# Patient Record
Sex: FEMALE | Race: WHITE | NOT HISPANIC OR LATINO | Employment: UNEMPLOYED | URBAN - METROPOLITAN AREA
[De-identification: names, ages, dates, MRNs, and addresses within clinical notes are randomized per-mention and may not be internally consistent; named-entity substitution may affect disease eponyms.]

---

## 2020-06-10 ENCOUNTER — TELEPHONE ENCOUNTER (OUTPATIENT)
Dept: URBAN - METROPOLITAN AREA CLINIC 68 | Facility: CLINIC | Age: 60
End: 2020-06-10

## 2020-07-13 ENCOUNTER — OFFICE VISIT (OUTPATIENT)
Dept: URBAN - METROPOLITAN AREA CLINIC 66 | Facility: CLINIC | Age: 60
End: 2020-07-13

## 2020-07-13 ENCOUNTER — OFFICE VISIT (OUTPATIENT)
Dept: URBAN - METROPOLITAN AREA CLINIC 68 | Facility: CLINIC | Age: 60
End: 2020-07-13

## 2020-07-21 ENCOUNTER — TELEPHONE ENCOUNTER (OUTPATIENT)
Dept: URBAN - METROPOLITAN AREA CLINIC 68 | Facility: CLINIC | Age: 60
End: 2020-07-21

## 2020-09-16 ENCOUNTER — OFFICE VISIT (OUTPATIENT)
Dept: URBAN - METROPOLITAN AREA CLINIC 68 | Facility: CLINIC | Age: 60
End: 2020-09-16

## 2020-10-06 ENCOUNTER — OFFICE VISIT (OUTPATIENT)
Dept: URBAN - METROPOLITAN AREA CLINIC 68 | Facility: CLINIC | Age: 60
End: 2020-10-06

## 2020-10-16 ENCOUNTER — OFFICE VISIT (OUTPATIENT)
Dept: URBAN - METROPOLITAN AREA CLINIC 68 | Facility: CLINIC | Age: 60
End: 2020-10-16

## 2020-11-27 ENCOUNTER — TELEPHONE ENCOUNTER (OUTPATIENT)
Dept: URBAN - METROPOLITAN AREA CLINIC 68 | Facility: CLINIC | Age: 60
End: 2020-11-27

## 2020-11-29 ENCOUNTER — TELEPHONE ENCOUNTER (OUTPATIENT)
Dept: URBAN - METROPOLITAN AREA CLINIC 68 | Facility: CLINIC | Age: 60
End: 2020-11-29

## 2020-12-01 ENCOUNTER — TELEPHONE ENCOUNTER (OUTPATIENT)
Dept: URBAN - METROPOLITAN AREA CLINIC 68 | Facility: CLINIC | Age: 60
End: 2020-12-01

## 2020-12-04 ENCOUNTER — OFFICE VISIT (OUTPATIENT)
Dept: URBAN - METROPOLITAN AREA CLINIC 68 | Facility: CLINIC | Age: 60
End: 2020-12-04

## 2020-12-22 ENCOUNTER — TELEPHONE ENCOUNTER (OUTPATIENT)
Dept: URBAN - METROPOLITAN AREA CLINIC 68 | Facility: CLINIC | Age: 60
End: 2020-12-22

## 2020-12-24 ENCOUNTER — TELEPHONE ENCOUNTER (OUTPATIENT)
Dept: URBAN - METROPOLITAN AREA CLINIC 68 | Facility: CLINIC | Age: 60
End: 2020-12-24

## 2020-12-24 ENCOUNTER — OFFICE VISIT (OUTPATIENT)
Dept: URBAN - METROPOLITAN AREA CLINIC 68 | Facility: CLINIC | Age: 60
End: 2020-12-24

## 2021-01-01 ENCOUNTER — OFFICE VISIT (OUTPATIENT)
Dept: URBAN - METROPOLITAN AREA CLINIC 68 | Facility: CLINIC | Age: 61
End: 2021-01-01

## 2021-01-05 ENCOUNTER — TELEPHONE ENCOUNTER (OUTPATIENT)
Dept: URBAN - METROPOLITAN AREA CLINIC 68 | Facility: CLINIC | Age: 61
End: 2021-01-05

## 2021-02-10 ENCOUNTER — TELEPHONE ENCOUNTER (OUTPATIENT)
Dept: URBAN - METROPOLITAN AREA CLINIC 68 | Facility: CLINIC | Age: 61
End: 2021-02-10

## 2021-02-22 ENCOUNTER — OFFICE VISIT (OUTPATIENT)
Dept: URBAN - METROPOLITAN AREA CLINIC 66 | Facility: CLINIC | Age: 61
End: 2021-02-22

## 2021-04-09 ENCOUNTER — OFFICE VISIT (OUTPATIENT)
Dept: URBAN - METROPOLITAN AREA CLINIC 68 | Facility: CLINIC | Age: 61
End: 2021-04-09

## 2021-07-28 ENCOUNTER — TELEPHONE ENCOUNTER (OUTPATIENT)
Dept: URBAN - METROPOLITAN AREA CLINIC 68 | Facility: CLINIC | Age: 61
End: 2021-07-28

## 2021-08-23 ENCOUNTER — OFFICE VISIT (OUTPATIENT)
Dept: URBAN - METROPOLITAN AREA CLINIC 66 | Facility: CLINIC | Age: 61
End: 2021-08-23

## 2021-09-27 ENCOUNTER — OFFICE VISIT (OUTPATIENT)
Dept: URBAN - METROPOLITAN AREA CLINIC 66 | Facility: CLINIC | Age: 61
End: 2021-09-27

## 2021-12-17 ENCOUNTER — OFFICE VISIT (OUTPATIENT)
Dept: URBAN - METROPOLITAN AREA CLINIC 68 | Facility: CLINIC | Age: 61
End: 2021-12-17

## 2022-02-07 ENCOUNTER — TELEPHONE ENCOUNTER (OUTPATIENT)
Dept: URBAN - METROPOLITAN AREA CLINIC 68 | Facility: CLINIC | Age: 62
End: 2022-02-07

## 2022-06-04 ENCOUNTER — TELEPHONE ENCOUNTER (OUTPATIENT)
Dept: URBAN - METROPOLITAN AREA CLINIC 68 | Facility: CLINIC | Age: 62
End: 2022-06-04

## 2022-06-04 RX ORDER — POLYETHYLENE GLYCOL 3350, SODIUM SULFATE ANHYDROUS, SODIUM BICARBONATE, SODIUM CHLORIDE, POTASSIUM CHLORIDE 236; 22.74; 6.74; 5.86; 2.97 G/4L; G/4L; G/4L; G/4L; G/4L
POWDER, FOR SOLUTION ORAL ONCE
Qty: 1 | Refills: 0 | OUTPATIENT
Start: 2019-07-08 | End: 2019-07-09

## 2022-06-04 RX ORDER — LUBIPROSTONE 24 UG/1
AMITIZA( 24MCG ORAL  TWO TIMES DAILY ) INACTIVE -HX ENTRY CAPSULE, GELATIN COATED ORAL
OUTPATIENT
Start: 2020-03-02

## 2022-06-04 RX ORDER — LINACLOTIDE 290 UG/1
CAPSULE, GELATIN COATED ORAL DAILY
Qty: 90 | Refills: 90 | OUTPATIENT
Start: 2020-03-23 | End: 2021-02-22

## 2022-06-04 RX ORDER — PANTOPRAZOLE SODIUM 40 MG/1
TABLET, DELAYED RELEASE ORAL DAILY
Qty: 90 | Refills: 0 | OUTPATIENT
Start: 2020-12-22 | End: 2021-03-22

## 2022-06-05 ENCOUNTER — TELEPHONE ENCOUNTER (OUTPATIENT)
Dept: URBAN - METROPOLITAN AREA CLINIC 68 | Facility: CLINIC | Age: 62
End: 2022-06-05

## 2022-06-05 RX ORDER — BRAN 5 G
LIPOFLAVONOID(  ORAL  TWICE A DAY ) ACTIVE -HX ENTRY WAFER ORAL TWICE A DAY
Status: ACTIVE | COMMUNITY
Start: 2021-09-27

## 2022-06-05 RX ORDER — AMITRIPTYLINE HYDROCHLORIDE 10 MG/1
AMITRIPTYLINE HCL( 10MG ORAL  DAILY ) ACTIVE -HX ENTRY TABLET, FILM COATED ORAL DAILY
Status: ACTIVE | COMMUNITY
Start: 2021-09-27

## 2022-06-05 RX ORDER — BACLOFEN 20 MG/1
BACLOFEN( 20MG ORAL  TWICE A DAY ) ACTIVE -HX ENTRY TABLET ORAL TWICE A DAY
Status: ACTIVE | COMMUNITY
Start: 2021-09-27

## 2022-06-05 RX ORDER — AMLODIPINE BESYLATE 5 MG/1
AMLODIPINE BESYLATE( 5MG ORAL  DAILY ) ACTIVE -HX ENTRY TABLET ORAL DAILY
Status: ACTIVE | COMMUNITY
Start: 2021-09-27

## 2022-06-05 RX ORDER — PRUCALOPRIDE 2 MG/1
TABLET, FILM COATED ORAL DAILY
Qty: 90 | Refills: 90 | Status: ACTIVE | COMMUNITY
Start: 2022-02-07

## 2022-06-05 RX ORDER — ESTRADIOL 0.1 MG/G
ESTRACE( 0.1MG/GM VAGINAL   ) ACTIVE -HX ENTRY CREAM VAGINAL
Status: ACTIVE | COMMUNITY
Start: 2021-09-27

## 2022-06-05 RX ORDER — SUMATRIPTAN SUCCINATE 50 MG
IMITREX( 50MG ORAL  DAILY ) ACTIVE -HX ENTRY TABLET ORAL DAILY
Status: ACTIVE | COMMUNITY
Start: 2021-09-27

## 2022-06-05 RX ORDER — LOSARTAN POTASSIUM AND HYDROCHLOROTHIAZIDE 100; 12.5 MG/1; MG/1
LOSARTAN POTASSIUM-HCTZ( 100-12.5MG ORAL  DAILY ) ACTIVE -HX ENTRY TABLET, FILM COATED ORAL DAILY
Status: ACTIVE | COMMUNITY
Start: 2021-09-27

## 2022-06-25 ENCOUNTER — TELEPHONE ENCOUNTER (OUTPATIENT)
Age: 62
End: 2022-06-25

## 2022-06-25 RX ORDER — LUBIPROSTONE 24 UG/1
AMITIZA( 24MCG ORAL  TWO TIMES DAILY ) INACTIVE -HX ENTRY CAPSULE, GELATIN COATED ORAL
OUTPATIENT
Start: 2020-03-02

## 2022-06-25 RX ORDER — LINACLOTIDE 290 UG/1
CAPSULE, GELATIN COATED ORAL DAILY
Qty: 90 | Refills: 90 | OUTPATIENT
Start: 2020-03-23 | End: 2021-02-22

## 2022-06-25 RX ORDER — PANTOPRAZOLE 40 MG/1
TABLET, DELAYED RELEASE ORAL DAILY
Qty: 90 | Refills: 0 | OUTPATIENT
Start: 2020-12-22 | End: 2021-03-22

## 2022-06-25 RX ORDER — POLYETHYLENE GLYCOL 3350, SODIUM SULFATE ANHYDROUS, SODIUM BICARBONATE, SODIUM CHLORIDE, POTASSIUM CHLORIDE 236; 22.74; 6.74; 5.86; 2.97 G/4L; G/4L; G/4L; G/4L; G/4L
POWDER, FOR SOLUTION ORAL ONCE
Qty: 1 | Refills: 0 | OUTPATIENT
Start: 2019-07-08 | End: 2019-07-09

## 2022-06-26 ENCOUNTER — TELEPHONE ENCOUNTER (OUTPATIENT)
Age: 62
End: 2022-06-26

## 2022-06-26 RX ORDER — LOSARTAN POTASSIUM AND HYDROCHLOROTHIAZIDE 12.5; 1 MG/1; MG/1
LOSARTAN POTASSIUM-HCTZ( 100-12.5MG ORAL  DAILY ) ACTIVE -HX ENTRY TABLET, FILM COATED ORAL DAILY
Status: ACTIVE | COMMUNITY
Start: 2021-09-27

## 2022-06-26 RX ORDER — AMLODIPINE BESYLATE 5 MG/1
AMLODIPINE BESYLATE( 5MG ORAL  DAILY ) ACTIVE -HX ENTRY TABLET ORAL DAILY
Status: ACTIVE | COMMUNITY
Start: 2021-09-27

## 2022-06-26 RX ORDER — AMITRIPTYLINE HYDROCHLORIDE 10 MG/1
AMITRIPTYLINE HCL( 10MG ORAL  DAILY ) ACTIVE -HX ENTRY TABLET, FILM COATED ORAL DAILY
Status: ACTIVE | COMMUNITY
Start: 2021-09-27

## 2022-06-26 RX ORDER — SUMATRIPTAN SUCCINATE 50 MG
IMITREX( 50MG ORAL  DAILY ) ACTIVE -HX ENTRY TABLET ORAL DAILY
Status: ACTIVE | COMMUNITY
Start: 2021-09-27

## 2022-06-26 RX ORDER — PRUCALOPRIDE 2 MG/1
TABLET, FILM COATED ORAL DAILY
Qty: 90 | Refills: 90 | Status: ACTIVE | COMMUNITY
Start: 2022-02-07

## 2022-06-26 RX ORDER — BACLOFEN 20 MG/1
BACLOFEN( 20MG ORAL  TWICE A DAY ) ACTIVE -HX ENTRY TABLET ORAL TWICE A DAY
Status: ACTIVE | COMMUNITY
Start: 2021-09-27

## 2022-06-26 RX ORDER — ESTRADIOL 0.1 MG/G
ESTRACE( 0.1MG/GM VAGINAL   ) ACTIVE -HX ENTRY CREAM VAGINAL
Status: ACTIVE | COMMUNITY
Start: 2021-09-27

## 2022-06-26 RX ORDER — CALCIUM CARBONATE/VITAMIN D3 600 MG-10
CALCIUM-VITAMIN D( 600MG ORAL   ) ACTIVE -HX ENTRY TABLET ORAL
Status: ACTIVE | COMMUNITY
Start: 2021-09-27

## 2022-09-19 ENCOUNTER — TELEPHONE ENCOUNTER (OUTPATIENT)
Dept: URBAN - METROPOLITAN AREA CLINIC 68 | Facility: CLINIC | Age: 62
End: 2022-09-19

## 2022-09-19 RX ORDER — PRUCALOPRIDE 2 MG/1
1 TABLET TABLET, FILM COATED ORAL ONCE A DAY
Qty: 90 TABLET | Refills: 0
Start: 2022-02-07 | End: 2022-12-17

## 2022-09-26 ENCOUNTER — OFFICE VISIT (OUTPATIENT)
Dept: URBAN - METROPOLITAN AREA CLINIC 66 | Facility: CLINIC | Age: 62
End: 2022-09-26

## 2022-10-18 ENCOUNTER — OFFICE VISIT (OUTPATIENT)
Dept: URBAN - METROPOLITAN AREA CLINIC 68 | Facility: CLINIC | Age: 62
End: 2022-10-18

## 2022-10-18 RX ORDER — PRUCALOPRIDE 2 MG/1
1 TABLET TABLET, FILM COATED ORAL ONCE A DAY
Qty: 90 TABLET | Refills: 3
Start: 2022-02-07 | End: 2023-10-13

## 2022-10-18 RX ORDER — AMLODIPINE BESYLATE 5 MG/1
AMLODIPINE BESYLATE( 5MG ORAL  DAILY ) ACTIVE -HX ENTRY TABLET ORAL DAILY
Status: ACTIVE | COMMUNITY
Start: 2021-09-27

## 2022-10-18 RX ORDER — BACLOFEN 20 MG/1
BACLOFEN( 20MG ORAL  TWICE A DAY ) ACTIVE -HX ENTRY TABLET ORAL TWICE A DAY
Status: ACTIVE | COMMUNITY
Start: 2021-09-27

## 2022-10-18 RX ORDER — LOSARTAN POTASSIUM AND HYDROCHLOROTHIAZIDE 100; 12.5 MG/1; MG/1
LOSARTAN POTASSIUM-HCTZ( 100-12.5MG ORAL  DAILY ) ACTIVE -HX ENTRY TABLET, FILM COATED ORAL DAILY
Status: ACTIVE | COMMUNITY
Start: 2021-09-27

## 2022-10-18 RX ORDER — BRAN 5 G
LIPOFLAVONOID(  ORAL  TWICE A DAY ) ACTIVE -HX ENTRY WAFER ORAL TWICE A DAY
Status: ACTIVE | COMMUNITY
Start: 2021-09-27

## 2022-10-18 RX ORDER — SUMATRIPTAN SUCCINATE 50 MG
IMITREX( 50MG ORAL  DAILY ) ACTIVE -HX ENTRY TABLET ORAL DAILY
Status: ACTIVE | COMMUNITY
Start: 2021-09-27

## 2022-10-18 RX ORDER — AMITRIPTYLINE HYDROCHLORIDE 10 MG/1
AMITRIPTYLINE HCL( 10MG ORAL  DAILY ) ACTIVE -HX ENTRY TABLET, FILM COATED ORAL DAILY
Status: ACTIVE | COMMUNITY
Start: 2021-09-27

## 2022-10-18 RX ORDER — PRUCALOPRIDE 2 MG/1
1 TABLET TABLET, FILM COATED ORAL ONCE A DAY
Qty: 90 TABLET | Refills: 0 | Status: ACTIVE | COMMUNITY
Start: 2022-02-07 | End: 2022-12-17

## 2022-10-18 RX ORDER — SOD SULF/POT CHLORIDE/MAG SULF 1.479 G
AS DIRECTED TABLET ORAL ONCE
Qty: 1 PACKET | Refills: 0 | OUTPATIENT
Start: 2022-10-18

## 2022-10-18 RX ORDER — ESTRADIOL 0.1 MG/G
ESTRACE( 0.1MG/GM VAGINAL   ) ACTIVE -HX ENTRY CREAM VAGINAL
Status: ACTIVE | COMMUNITY
Start: 2021-09-27

## 2022-10-18 NOTE — HPI-MIGRATED HPI
Transition of Care : Patient evaluated due to IBS and colon polyps.  Currently on treatment with motegrity for constipation.  Denies nausea, vomits, dysphagia, odynophagia, heartburn, abdominal pain, diarrhea,  GI bleeding or weight loss

## 2022-11-09 ENCOUNTER — OFFICE VISIT (OUTPATIENT)
Dept: URBAN - METROPOLITAN AREA SURGERY CENTER 12 | Facility: SURGERY CENTER | Age: 62
End: 2022-11-09

## 2022-11-09 RX ORDER — AMITRIPTYLINE HYDROCHLORIDE 10 MG/1
AMITRIPTYLINE HCL( 10MG ORAL  DAILY ) ACTIVE -HX ENTRY TABLET, FILM COATED ORAL DAILY
Status: ACTIVE | COMMUNITY
Start: 2021-09-27

## 2022-11-09 RX ORDER — ESTRADIOL 0.1 MG/G
ESTRACE( 0.1MG/GM VAGINAL   ) ACTIVE -HX ENTRY CREAM VAGINAL
Status: ACTIVE | COMMUNITY
Start: 2021-09-27

## 2022-11-09 RX ORDER — SUMATRIPTAN SUCCINATE 50 MG
IMITREX( 50MG ORAL  DAILY ) ACTIVE -HX ENTRY TABLET ORAL DAILY
Status: ACTIVE | COMMUNITY
Start: 2021-09-27

## 2022-11-09 RX ORDER — BACLOFEN 20 MG/1
BACLOFEN( 20MG ORAL  TWICE A DAY ) ACTIVE -HX ENTRY TABLET ORAL TWICE A DAY
Status: ACTIVE | COMMUNITY
Start: 2021-09-27

## 2022-11-09 RX ORDER — PRUCALOPRIDE 2 MG/1
1 TABLET TABLET, FILM COATED ORAL ONCE A DAY
Qty: 90 TABLET | Refills: 3 | Status: ACTIVE | COMMUNITY
Start: 2022-02-07 | End: 2023-10-13

## 2022-11-09 RX ORDER — LOSARTAN POTASSIUM AND HYDROCHLOROTHIAZIDE 100; 12.5 MG/1; MG/1
LOSARTAN POTASSIUM-HCTZ( 100-12.5MG ORAL  DAILY ) ACTIVE -HX ENTRY TABLET, FILM COATED ORAL DAILY
Status: ACTIVE | COMMUNITY
Start: 2021-09-27

## 2022-11-09 RX ORDER — BRAN 5 G
LIPOFLAVONOID(  ORAL  TWICE A DAY ) ACTIVE -HX ENTRY WAFER ORAL TWICE A DAY
Status: ACTIVE | COMMUNITY
Start: 2021-09-27

## 2022-11-09 RX ORDER — AMLODIPINE BESYLATE 5 MG/1
AMLODIPINE BESYLATE( 5MG ORAL  DAILY ) ACTIVE -HX ENTRY TABLET ORAL DAILY
Status: ACTIVE | COMMUNITY
Start: 2021-09-27

## 2022-11-09 RX ORDER — SOD SULF/POT CHLORIDE/MAG SULF 1.479 G
AS DIRECTED TABLET ORAL ONCE
Qty: 1 PACKET | Refills: 0 | Status: ACTIVE | COMMUNITY
Start: 2022-10-18

## 2023-09-19 ENCOUNTER — OFFICE VISIT (OUTPATIENT)
Dept: URBAN - METROPOLITAN AREA CLINIC 66 | Facility: CLINIC | Age: 63
End: 2023-09-19
Payer: MEDICARE

## 2023-09-19 ENCOUNTER — LAB OUTSIDE AN ENCOUNTER (OUTPATIENT)
Dept: URBAN - METROPOLITAN AREA CLINIC 66 | Facility: CLINIC | Age: 63
End: 2023-09-19

## 2023-09-19 ENCOUNTER — WEB ENCOUNTER (OUTPATIENT)
Dept: URBAN - METROPOLITAN AREA CLINIC 66 | Facility: CLINIC | Age: 63
End: 2023-09-19

## 2023-09-19 VITALS
SYSTOLIC BLOOD PRESSURE: 120 MMHG | HEIGHT: 65 IN | BODY MASS INDEX: 20.99 KG/M2 | DIASTOLIC BLOOD PRESSURE: 74 MMHG | WEIGHT: 126 LBS

## 2023-09-19 DIAGNOSIS — Z86.010 HISTORY OF COLON POLYPS: ICD-10-CM

## 2023-09-19 DIAGNOSIS — K76.89 LIVER CYST: ICD-10-CM

## 2023-09-19 DIAGNOSIS — K58.1 IRRITABLE BOWEL SYNDROME WITH CONSTIPATION: ICD-10-CM

## 2023-09-19 PROCEDURE — 99214 OFFICE O/P EST MOD 30 MIN: CPT

## 2023-09-19 RX ORDER — SOD SULF/POT CHLORIDE/MAG SULF 1.479 G
AS DIRECTED TABLET ORAL ONCE
Qty: 1 PACKET | Refills: 0 | Status: ON HOLD | COMMUNITY
Start: 2022-10-18

## 2023-09-19 RX ORDER — SUMATRIPTAN SUCCINATE 50 MG
IMITREX( 50MG ORAL  DAILY ) ACTIVE -HX ENTRY TABLET ORAL DAILY
Status: ACTIVE | COMMUNITY
Start: 2021-09-27

## 2023-09-19 RX ORDER — LOSARTAN POTASSIUM AND HYDROCHLOROTHIAZIDE 100; 12.5 MG/1; MG/1
LOSARTAN POTASSIUM-HCTZ( 100-12.5MG ORAL  DAILY ) ACTIVE -HX ENTRY TABLET, FILM COATED ORAL DAILY
Status: ACTIVE | COMMUNITY
Start: 2021-09-27

## 2023-09-19 RX ORDER — ESTRADIOL 0.1 MG/G
ESTRACE( 0.1MG/GM VAGINAL   ) ACTIVE -HX ENTRY CREAM VAGINAL
Status: ACTIVE | COMMUNITY
Start: 2021-09-27

## 2023-09-19 RX ORDER — AMITRIPTYLINE HYDROCHLORIDE 10 MG/1
AMITRIPTYLINE HCL( 10MG ORAL  DAILY ) ACTIVE -HX ENTRY TABLET, FILM COATED ORAL DAILY
Status: ACTIVE | COMMUNITY
Start: 2021-09-27

## 2023-09-19 RX ORDER — BRAN 5 G
LIPOFLAVONOID(  ORAL  TWICE A DAY ) ACTIVE -HX ENTRY WAFER ORAL TWICE A DAY
Status: ACTIVE | COMMUNITY
Start: 2021-09-27

## 2023-09-19 RX ORDER — BACLOFEN 20 MG/1
BACLOFEN( 20MG ORAL  TWICE A DAY ) ACTIVE -HX ENTRY TABLET ORAL TWICE A DAY
Status: ACTIVE | COMMUNITY
Start: 2021-09-27

## 2023-09-19 RX ORDER — PRUCALOPRIDE 2 MG/1
1 TABLET TABLET, FILM COATED ORAL ONCE A DAY
Qty: 90 TABLET | Refills: 3 | Status: ACTIVE | COMMUNITY
Start: 2022-02-07 | End: 2023-10-13

## 2023-09-19 RX ORDER — AMLODIPINE BESYLATE 5 MG/1
AMLODIPINE BESYLATE( 5MG ORAL  DAILY ) ACTIVE -HX ENTRY TABLET ORAL DAILY
Status: ACTIVE | COMMUNITY
Start: 2021-09-27

## 2023-09-19 RX ORDER — LINACLOTIDE 290 UG/1
1 CAPSULE AT LEAST 30 MINUTES BEFORE THE FIRST MEAL OF THE DAY ON AN EMPTY STOMACH CAPSULE, GELATIN COATED ORAL ONCE A DAY
Qty: 90 | Refills: 3 | OUTPATIENT
Start: 2023-09-19 | End: 2023-10-19

## 2023-09-19 NOTE — HPI-TODAY'S VISIT:
Patient evaluated due to IBS-C. Continues on Motegrity tx with good response however prefers to switch to Linzess for cost reasons. Refers history of hepatic cyst. She denies nausea/vomiting, dysphagia, abdominal pain, melena, rectal bleeding, weight loss, fever.

## 2023-09-20 ENCOUNTER — APPOINTMENT (RX ONLY)
Dept: URBAN - METROPOLITAN AREA CLINIC 331 | Facility: CLINIC | Age: 63
Setting detail: DERMATOLOGY
End: 2023-09-20

## 2023-09-20 DIAGNOSIS — L81.4 OTHER MELANIN HYPERPIGMENTATION: ICD-10-CM

## 2023-09-20 DIAGNOSIS — D18.0 HEMANGIOMA: ICD-10-CM

## 2023-09-20 DIAGNOSIS — L28.1 PRURIGO NODULARIS: ICD-10-CM

## 2023-09-20 DIAGNOSIS — L82.1 OTHER SEBORRHEIC KERATOSIS: ICD-10-CM

## 2023-09-20 DIAGNOSIS — D485 NEOPLASM OF UNCERTAIN BEHAVIOR OF SKIN: ICD-10-CM

## 2023-09-20 PROBLEM — D18.01 HEMANGIOMA OF SKIN AND SUBCUTANEOUS TISSUE: Status: ACTIVE | Noted: 2023-09-20

## 2023-09-20 PROBLEM — D48.5 NEOPLASM OF UNCERTAIN BEHAVIOR OF SKIN: Status: ACTIVE | Noted: 2023-09-20

## 2023-09-20 PROCEDURE — 11102 TANGNTL BX SKIN SINGLE LES: CPT

## 2023-09-20 PROCEDURE — ? BIOPSY BY SHAVE METHOD

## 2023-09-20 PROCEDURE — 99203 OFFICE O/P NEW LOW 30 MIN: CPT | Mod: 25

## 2023-09-20 PROCEDURE — ? COUNSELING

## 2023-09-20 PROCEDURE — 11103 TANGNTL BX SKIN EA SEP/ADDL: CPT

## 2023-09-20 PROCEDURE — ? TREATMENT REGIMEN

## 2023-09-20 PROCEDURE — ? PRESCRIPTION

## 2023-09-20 RX ORDER — TRIAMCINOLONE ACETONIDE 1 MG/G
CREAM TOPICAL BID
Qty: 80 | Refills: 3 | Status: ERX | COMMUNITY
Start: 2023-09-20

## 2023-09-20 RX ADMIN — TRIAMCINOLONE ACETONIDE: 1 CREAM TOPICAL at 00:00

## 2023-09-20 ASSESSMENT — LOCATION DETAILED DESCRIPTION DERM
LOCATION DETAILED: LEFT VENTRAL LATERAL PROXIMAL FOREARM
LOCATION DETAILED: RIGHT ANTERIOR MEDIAL PROXIMAL THIGH
LOCATION DETAILED: LEFT POSTERIOR SHOULDER
LOCATION DETAILED: RIGHT POSTERIOR SHOULDER
LOCATION DETAILED: LEFT SUPERIOR CENTRAL MALAR CHEEK
LOCATION DETAILED: RIGHT CENTRAL MALAR CHEEK
LOCATION DETAILED: RIGHT DISTAL POSTERIOR UPPER ARM
LOCATION DETAILED: LEFT ANTERIOR SHOULDER

## 2023-09-20 ASSESSMENT — LOCATION ZONE DERM
LOCATION ZONE: FACE
LOCATION ZONE: LEG
LOCATION ZONE: ARM

## 2023-09-20 ASSESSMENT — LOCATION SIMPLE DESCRIPTION DERM
LOCATION SIMPLE: RIGHT SHOULDER
LOCATION SIMPLE: LEFT SHOULDER
LOCATION SIMPLE: RIGHT THIGH
LOCATION SIMPLE: RIGHT CHEEK
LOCATION SIMPLE: LEFT FOREARM
LOCATION SIMPLE: RIGHT POSTERIOR UPPER ARM
LOCATION SIMPLE: LEFT CHEEK

## 2023-09-20 NOTE — PROCEDURE: COUNSELING
Detail Level: Detailed
Topical Retinoids Recommendations: Retinoids/Retinol at night
Bleaching Agents Recommendations: Vitamin C serum every day
Detail Level: Generalized

## 2023-09-29 ENCOUNTER — TELEPHONE ENCOUNTER (OUTPATIENT)
Dept: URBAN - METROPOLITAN AREA CLINIC 68 | Facility: CLINIC | Age: 63
End: 2023-09-29

## 2023-10-11 ENCOUNTER — APPOINTMENT (RX ONLY)
Dept: URBAN - METROPOLITAN AREA CLINIC 331 | Facility: CLINIC | Age: 63
Setting detail: DERMATOLOGY
End: 2023-10-11

## 2023-10-11 DIAGNOSIS — I78.8 OTHER DISEASES OF CAPILLARIES: ICD-10-CM

## 2023-10-11 DIAGNOSIS — L28.1 PRURIGO NODULARIS: ICD-10-CM

## 2023-10-11 PROBLEM — D23.71 OTHER BENIGN NEOPLASM OF SKIN OF RIGHT LOWER LIMB, INCLUDING HIP: Status: ACTIVE | Noted: 2023-10-11

## 2023-10-11 PROCEDURE — ? COUNSELING

## 2023-10-11 PROCEDURE — 99213 OFFICE O/P EST LOW 20 MIN: CPT

## 2023-10-11 PROCEDURE — ? PATHOLOGY DISCUSSION

## 2023-10-11 PROCEDURE — ? PRESCRIPTION MEDICATION MANAGEMENT

## 2023-10-11 ASSESSMENT — LOCATION SIMPLE DESCRIPTION DERM
LOCATION SIMPLE: LEFT FOREARM
LOCATION SIMPLE: RIGHT POSTERIOR UPPER ARM
LOCATION SIMPLE: RIGHT CHEEK

## 2023-10-11 ASSESSMENT — LOCATION DETAILED DESCRIPTION DERM
LOCATION DETAILED: RIGHT MEDIAL MALAR CHEEK
LOCATION DETAILED: RIGHT DISTAL POSTERIOR UPPER ARM
LOCATION DETAILED: LEFT VENTRAL LATERAL PROXIMAL FOREARM

## 2023-10-11 ASSESSMENT — LOCATION ZONE DERM
LOCATION ZONE: ARM
LOCATION ZONE: FACE

## 2023-10-13 ENCOUNTER — APPOINTMENT (RX ONLY)
Dept: URBAN - METROPOLITAN AREA CLINIC 331 | Facility: CLINIC | Age: 63
Setting detail: DERMATOLOGY
End: 2023-10-13

## 2023-10-13 DIAGNOSIS — Z41.9 ENCOUNTER FOR PROCEDURE FOR PURPOSES OTHER THAN REMEDYING HEALTH STATE, UNSPECIFIED: ICD-10-CM

## 2023-10-13 PROCEDURE — ? HYDRAFACIAL

## 2023-10-13 NOTE — PROCEDURE: HYDRAFACIAL
Number Of Passes: 0
Procedure: Fusion
Solution: Beta-HD
Solution Override
Tip: Hydropeel Tip, Teal
Procedure: Peel
Procedure: Extraction
Tip: Hydropeel Tip, Clear
Treatment Number: 1
Comments: Guest came in due to milia. I recommended and HydraFacial and educated her on how we get milia. I recommended Lytera due to her hyperpigmentation concern and I recommended Proscriptix Antioxidant cleanser. I made guest aware what order to use her products and recommended her to come back in 2-3 months for same tx.
Procedure: Boost
Indication: acne
Consent: Written consent obtained, risks reviewed including but not limited to crusting, scabbing, blistering, scarring, darker or lighter pigmentary change, bruising, and/or incomplete response.
Tip Override
Location: face
Procedure: Exfoliation
Tip: Hydropeel Tip, Blue
Post-Care Instructions: I reviewed with the patient in detail post-care instructions. Patient should stay away from the sun and wear sun protection until treated areas are fully healed.
Solution: GlySal 7.5%
Procedure: Extend and Protect
Solution: Activ-4
Price (Use Numbers Only, No Special Characters Or $): 225.00

## 2023-10-23 ENCOUNTER — APPOINTMENT (RX ONLY)
Dept: URBAN - METROPOLITAN AREA CLINIC 331 | Facility: CLINIC | Age: 63
Setting detail: DERMATOLOGY
End: 2023-10-23

## 2023-10-23 DIAGNOSIS — D18.0 HEMANGIOMA: ICD-10-CM | Status: STABLE

## 2023-10-23 PROBLEM — D18.01 HEMANGIOMA OF SKIN AND SUBCUTANEOUS TISSUE: Status: ACTIVE | Noted: 2023-10-23

## 2023-10-23 PROCEDURE — 99212 OFFICE O/P EST SF 10 MIN: CPT

## 2023-10-23 PROCEDURE — ? COUNSELING

## 2023-10-23 ASSESSMENT — LOCATION DETAILED DESCRIPTION DERM
LOCATION DETAILED: RIGHT MEDIAL MALAR CHEEK
LOCATION DETAILED: LEFT MEDIAL MALAR CHEEK

## 2023-10-23 ASSESSMENT — LOCATION ZONE DERM: LOCATION ZONE: FACE

## 2023-10-23 ASSESSMENT — LOCATION SIMPLE DESCRIPTION DERM
LOCATION SIMPLE: RIGHT CHEEK
LOCATION SIMPLE: LEFT CHEEK

## 2023-11-02 ENCOUNTER — OFFICE VISIT (OUTPATIENT)
Dept: URBAN - METROPOLITAN AREA CLINIC 66 | Facility: CLINIC | Age: 63
End: 2023-11-02
Payer: MEDICARE

## 2023-11-02 VITALS
OXYGEN SATURATION: 98 % | HEIGHT: 65 IN | BODY MASS INDEX: 20.83 KG/M2 | DIASTOLIC BLOOD PRESSURE: 70 MMHG | WEIGHT: 125 LBS | HEART RATE: 75 BPM | SYSTOLIC BLOOD PRESSURE: 118 MMHG

## 2023-11-02 DIAGNOSIS — K76.89 LIVER CYST: ICD-10-CM

## 2023-11-02 DIAGNOSIS — Z86.010 HISTORY OF COLON POLYPS: ICD-10-CM

## 2023-11-02 DIAGNOSIS — K58.1 IRRITABLE BOWEL SYNDROME WITH CONSTIPATION: ICD-10-CM

## 2023-11-02 PROCEDURE — 99213 OFFICE O/P EST LOW 20 MIN: CPT

## 2023-11-02 RX ORDER — SUMATRIPTAN SUCCINATE 50 MG
IMITREX( 50MG ORAL  DAILY ) ACTIVE -HX ENTRY TABLET ORAL DAILY
Status: ACTIVE | COMMUNITY
Start: 2021-09-27

## 2023-11-02 RX ORDER — BRAN 5 G
LIPOFLAVONOID(  ORAL  TWICE A DAY ) ACTIVE -HX ENTRY WAFER ORAL TWICE A DAY
Status: ACTIVE | COMMUNITY
Start: 2021-09-27

## 2023-11-02 RX ORDER — AMITRIPTYLINE HYDROCHLORIDE 10 MG/1
AMITRIPTYLINE HCL( 10MG ORAL  DAILY ) ACTIVE -HX ENTRY TABLET, FILM COATED ORAL DAILY
Status: ACTIVE | COMMUNITY
Start: 2021-09-27

## 2023-11-02 RX ORDER — LOSARTAN POTASSIUM AND HYDROCHLOROTHIAZIDE 100; 12.5 MG/1; MG/1
LOSARTAN POTASSIUM-HCTZ( 100-12.5MG ORAL  DAILY ) ACTIVE -HX ENTRY TABLET, FILM COATED ORAL DAILY
Status: ACTIVE | COMMUNITY
Start: 2021-09-27

## 2023-11-02 RX ORDER — AMLODIPINE BESYLATE 5 MG/1
AMLODIPINE BESYLATE( 5MG ORAL  DAILY ) ACTIVE -HX ENTRY TABLET ORAL DAILY
Status: ACTIVE | COMMUNITY
Start: 2021-09-27

## 2023-11-02 RX ORDER — BACLOFEN 20 MG/1
BACLOFEN( 20MG ORAL  TWICE A DAY ) ACTIVE -HX ENTRY TABLET ORAL TWICE A DAY
Status: ACTIVE | COMMUNITY
Start: 2021-09-27

## 2023-11-02 RX ORDER — ESTRADIOL 0.1 MG/G
ESTRACE( 0.1MG/GM VAGINAL   ) ACTIVE -HX ENTRY CREAM VAGINAL
Status: ACTIVE | COMMUNITY
Start: 2021-09-27

## 2023-11-02 RX ORDER — SOD SULF/POT CHLORIDE/MAG SULF 1.479 G
AS DIRECTED TABLET ORAL ONCE
Qty: 1 PACKET | Refills: 0 | Status: ON HOLD | COMMUNITY
Start: 2022-10-18

## 2023-11-02 NOTE — HPI-TODAY'S VISIT:
Patient evaluated due to IBS-C.  Refers she discontinued Motegrity and started Linzess on 10/15/2023. Refers some relief of constipation with daily bm's. She denies nausea/vomiting, dysphagia, abdominal pain, melena, rectal bleeding, weight loss, fever.

## 2023-11-06 ENCOUNTER — OFFICE VISIT (OUTPATIENT)
Dept: URBAN - METROPOLITAN AREA CLINIC 68 | Facility: CLINIC | Age: 63
End: 2023-11-06

## 2023-11-16 ENCOUNTER — WEB ENCOUNTER (OUTPATIENT)
Dept: URBAN - METROPOLITAN AREA CLINIC 66 | Facility: CLINIC | Age: 63
End: 2023-11-16

## 2023-11-27 ENCOUNTER — OFFICE VISIT (OUTPATIENT)
Dept: URBAN - METROPOLITAN AREA TELEHEALTH 1 | Facility: TELEHEALTH | Age: 63
End: 2023-11-27
Payer: MEDICARE

## 2023-11-27 VITALS
SYSTOLIC BLOOD PRESSURE: 118 MMHG | BODY MASS INDEX: 20.83 KG/M2 | HEIGHT: 65 IN | DIASTOLIC BLOOD PRESSURE: 70 MMHG | WEIGHT: 125 LBS

## 2023-11-27 DIAGNOSIS — Z86.010 HISTORY OF COLON POLYPS: ICD-10-CM

## 2023-11-27 DIAGNOSIS — K58.1 IRRITABLE BOWEL SYNDROME WITH CONSTIPATION: ICD-10-CM

## 2023-11-27 PROCEDURE — 99213 OFFICE O/P EST LOW 20 MIN: CPT

## 2023-11-27 RX ORDER — AMLODIPINE BESYLATE 5 MG/1
AMLODIPINE BESYLATE( 5MG ORAL  DAILY ) ACTIVE -HX ENTRY TABLET ORAL DAILY
Status: ACTIVE | COMMUNITY
Start: 2021-09-27

## 2023-11-27 RX ORDER — SUMATRIPTAN SUCCINATE 50 MG
IMITREX( 50MG ORAL  DAILY ) ACTIVE -HX ENTRY TABLET ORAL DAILY
Status: ACTIVE | COMMUNITY
Start: 2021-09-27

## 2023-11-27 RX ORDER — SOD SULF/POT CHLORIDE/MAG SULF 1.479 G
AS DIRECTED TABLET ORAL ONCE
Qty: 1 PACKET | Refills: 0 | Status: ON HOLD | COMMUNITY
Start: 2022-10-18

## 2023-11-27 RX ORDER — BRAN 5 G
LIPOFLAVONOID(  ORAL  TWICE A DAY ) ACTIVE -HX ENTRY WAFER ORAL TWICE A DAY
Status: ACTIVE | COMMUNITY
Start: 2021-09-27

## 2023-11-27 RX ORDER — BACLOFEN 20 MG/1
BACLOFEN( 20MG ORAL  TWICE A DAY ) ACTIVE -HX ENTRY TABLET ORAL TWICE A DAY
Status: ACTIVE | COMMUNITY
Start: 2021-09-27

## 2023-11-27 RX ORDER — ESTRADIOL 0.1 MG/G
ESTRACE( 0.1MG/GM VAGINAL   ) ACTIVE -HX ENTRY CREAM VAGINAL
Status: ACTIVE | COMMUNITY
Start: 2021-09-27

## 2023-11-27 RX ORDER — AMITRIPTYLINE HYDROCHLORIDE 10 MG/1
AMITRIPTYLINE HCL( 10MG ORAL  DAILY ) ACTIVE -HX ENTRY TABLET, FILM COATED ORAL DAILY
Status: ACTIVE | COMMUNITY
Start: 2021-09-27

## 2023-11-27 RX ORDER — LOSARTAN POTASSIUM AND HYDROCHLOROTHIAZIDE 100; 12.5 MG/1; MG/1
LOSARTAN POTASSIUM-HCTZ( 100-12.5MG ORAL  DAILY ) ACTIVE -HX ENTRY TABLET, FILM COATED ORAL DAILY
Status: ACTIVE | COMMUNITY
Start: 2021-09-27

## 2023-11-27 NOTE — HPI-TODAY'S VISIT:
Patient presents via telehealth for follow-up due to IBS-C. Refers she has been taking Linzess 290mcg/d for six weeks with significant relief. Refers she is having normal daily bm's.  Denies excess gas/bloating. She denies nausea/vomiting, dysphagia, abdominal pain, melena, rectal bleeding, weight loss, fever.

## 2024-01-31 ENCOUNTER — OFFICE VISIT (OUTPATIENT)
Dept: URBAN - METROPOLITAN AREA CLINIC 66 | Facility: CLINIC | Age: 64
End: 2024-01-31
Payer: MEDICARE

## 2024-01-31 VITALS
HEIGHT: 65 IN | SYSTOLIC BLOOD PRESSURE: 120 MMHG | WEIGHT: 127 LBS | BODY MASS INDEX: 21.16 KG/M2 | HEART RATE: 71 BPM | OXYGEN SATURATION: 96 % | DIASTOLIC BLOOD PRESSURE: 74 MMHG

## 2024-01-31 DIAGNOSIS — K58.1 IRRITABLE BOWEL SYNDROME WITH CONSTIPATION: ICD-10-CM

## 2024-01-31 DIAGNOSIS — K76.89 LIVER CYST: ICD-10-CM

## 2024-01-31 DIAGNOSIS — Z86.010 HISTORY OF COLON POLYPS: ICD-10-CM

## 2024-01-31 PROCEDURE — 99214 OFFICE O/P EST MOD 30 MIN: CPT

## 2024-01-31 RX ORDER — AMITRIPTYLINE HYDROCHLORIDE 10 MG/1
AMITRIPTYLINE HCL( 10MG ORAL  DAILY ) ACTIVE -HX ENTRY TABLET, FILM COATED ORAL DAILY
Status: ACTIVE | COMMUNITY
Start: 2021-09-27

## 2024-01-31 RX ORDER — BACLOFEN 20 MG/1
BACLOFEN( 20MG ORAL  TWICE A DAY ) ACTIVE -HX ENTRY TABLET ORAL TWICE A DAY
Status: ACTIVE | COMMUNITY
Start: 2021-09-27

## 2024-01-31 RX ORDER — LOSARTAN POTASSIUM AND HYDROCHLOROTHIAZIDE 100; 12.5 MG/1; MG/1
LOSARTAN POTASSIUM-HCTZ( 100-12.5MG ORAL  DAILY ) ACTIVE -HX ENTRY TABLET, FILM COATED ORAL DAILY
Status: ACTIVE | COMMUNITY
Start: 2021-09-27

## 2024-01-31 RX ORDER — ESTRADIOL 0.1 MG/G
ESTRACE( 0.1MG/GM VAGINAL   ) ACTIVE -HX ENTRY CREAM VAGINAL
Status: ACTIVE | COMMUNITY
Start: 2021-09-27

## 2024-01-31 RX ORDER — SUMATRIPTAN SUCCINATE 50 MG
IMITREX( 50MG ORAL  DAILY ) ACTIVE -HX ENTRY TABLET ORAL DAILY
Status: ACTIVE | COMMUNITY
Start: 2021-09-27

## 2024-01-31 RX ORDER — AMLODIPINE BESYLATE 5 MG/1
AMLODIPINE BESYLATE( 5MG ORAL  DAILY ) ACTIVE -HX ENTRY TABLET ORAL DAILY
Status: ACTIVE | COMMUNITY
Start: 2021-09-27

## 2024-01-31 NOTE — PHYSICAL EXAM EYES:
Called mail order pharmacy to cancel- rx pended to [facility]    Conjunctivae and eyelids appear normal,  Sclerae : White without injection

## 2024-01-31 NOTE — HPI-TODAY'S VISIT:
Patient presents for follow-up due to IBS-C. Refers continues with Linzess 290mcg/d with significant relief. Refers approximately once a week will experience difficulty passing stool with straining. She denies nausea/vomiting, dysphagia, abdominal pain, diarrhea, melena, rectal bleeding, weight loss, fever.

## 2024-05-08 ENCOUNTER — OFFICE VISIT (OUTPATIENT)
Dept: URBAN - METROPOLITAN AREA CLINIC 66 | Facility: CLINIC | Age: 64
End: 2024-05-08
Payer: MEDICARE

## 2024-05-08 ENCOUNTER — DASHBOARD ENCOUNTERS (OUTPATIENT)
Age: 64
End: 2024-05-08

## 2024-05-08 VITALS
HEART RATE: 68 BPM | BODY MASS INDEX: 21.19 KG/M2 | SYSTOLIC BLOOD PRESSURE: 122 MMHG | OXYGEN SATURATION: 99 % | WEIGHT: 127.2 LBS | HEIGHT: 65 IN | DIASTOLIC BLOOD PRESSURE: 74 MMHG

## 2024-05-08 DIAGNOSIS — K76.89 LIVER CYST: ICD-10-CM

## 2024-05-08 DIAGNOSIS — K58.1 IRRITABLE BOWEL SYNDROME WITH CONSTIPATION: ICD-10-CM

## 2024-05-08 DIAGNOSIS — Z86.010 HISTORY OF COLON POLYPS: ICD-10-CM

## 2024-05-08 PROCEDURE — 99214 OFFICE O/P EST MOD 30 MIN: CPT

## 2024-05-08 RX ORDER — AMLODIPINE BESYLATE 5 MG/1
AMLODIPINE BESYLATE( 5MG ORAL  DAILY ) ACTIVE -HX ENTRY TABLET ORAL DAILY
Status: ACTIVE | COMMUNITY
Start: 2021-09-27

## 2024-05-08 RX ORDER — SUMATRIPTAN SUCCINATE 50 MG
IMITREX( 50MG ORAL  DAILY ) ACTIVE -HX ENTRY TABLET ORAL DAILY
Status: ACTIVE | COMMUNITY
Start: 2021-09-27

## 2024-05-08 RX ORDER — AMITRIPTYLINE HYDROCHLORIDE 10 MG/1
AMITRIPTYLINE HCL( 10MG ORAL  DAILY ) ACTIVE -HX ENTRY TABLET, FILM COATED ORAL DAILY
Status: ACTIVE | COMMUNITY
Start: 2021-09-27

## 2024-05-08 RX ORDER — BACLOFEN 20 MG/1
BACLOFEN( 20MG ORAL  TWICE A DAY ) ACTIVE -HX ENTRY TABLET ORAL TWICE A DAY
Status: ACTIVE | COMMUNITY
Start: 2021-09-27

## 2024-05-08 RX ORDER — ESTRADIOL 0.1 MG/G
ESTRACE( 0.1MG/GM VAGINAL   ) ACTIVE -HX ENTRY CREAM VAGINAL
Status: ACTIVE | COMMUNITY
Start: 2021-09-27

## 2024-05-08 RX ORDER — LOSARTAN POTASSIUM AND HYDROCHLOROTHIAZIDE 100; 12.5 MG/1; MG/1
LOSARTAN POTASSIUM-HCTZ( 100-12.5MG ORAL  DAILY ) ACTIVE -HX ENTRY TABLET, FILM COATED ORAL DAILY
Status: ACTIVE | COMMUNITY
Start: 2021-09-27

## 2024-07-18 ENCOUNTER — ERX REFILL RESPONSE (OUTPATIENT)
Dept: URBAN - METROPOLITAN AREA CLINIC 66 | Facility: CLINIC | Age: 64
End: 2024-07-18

## 2024-07-18 RX ORDER — LINACLOTIDE 290 UG/1
TAKE 1 CAPSULE BY MOUTH EVERY DAY AT LEAST 30 MINUTES BEFORE FIRST MEAL OF THE DAY ON AN EMPTY STOMACH CAPSULE, GELATIN COATED ORAL
Qty: 90 CAPSULE | Refills: 2 | OUTPATIENT

## 2024-07-18 RX ORDER — LINACLOTIDE 290 UG/1
TAKE 1 CAPSULE BY MOUTH EVERY DAY AT LEAST 30 MINUTES BEFORE FIRST MEAL OF THE DAY ON AN EMPTY STOMACH CAPSULE, GELATIN COATED ORAL
Qty: 90 CAPSULE | Refills: 0 | OUTPATIENT

## 2025-03-24 ENCOUNTER — TELEPHONE ENCOUNTER (OUTPATIENT)
Dept: URBAN - METROPOLITAN AREA CLINIC 66 | Facility: CLINIC | Age: 65
End: 2025-03-24

## 2025-03-24 RX ORDER — LINACLOTIDE 290 UG/1
1 CAPSULE AT LEAST 30 MINUTES BEFORE THE FIRST MEAL OF THE DAY ON AN EMPTY STOMACH CAPSULE, GELATIN COATED ORAL ONCE A DAY
Qty: 90 CAPSULES | Refills: 2

## 2025-05-13 ENCOUNTER — OFFICE VISIT (OUTPATIENT)
Dept: URBAN - METROPOLITAN AREA CLINIC 66 | Facility: CLINIC | Age: 65
End: 2025-05-13
Payer: MEDICARE

## 2025-05-13 DIAGNOSIS — K58.1 IRRITABLE BOWEL SYNDROME WITH CONSTIPATION: ICD-10-CM

## 2025-05-13 DIAGNOSIS — Z86.0100 PERSONAL HISTORY OF COLONIC POLYPS: ICD-10-CM

## 2025-05-13 DIAGNOSIS — K76.89 LIVER CYST: ICD-10-CM

## 2025-05-13 PROCEDURE — 99214 OFFICE O/P EST MOD 30 MIN: CPT

## 2025-05-13 RX ORDER — LINACLOTIDE 290 UG/1
1 CAPSULE AT LEAST 30 MINUTES BEFORE THE FIRST MEAL OF THE DAY ON AN EMPTY STOMACH CAPSULE, GELATIN COATED ORAL ONCE A DAY
Qty: 90 CAPSULES | Refills: 2 | Status: ACTIVE | COMMUNITY

## 2025-05-13 RX ORDER — AMITRIPTYLINE HYDROCHLORIDE 10 MG/1
AMITRIPTYLINE HCL( 10MG ORAL  DAILY ) ACTIVE -HX ENTRY TABLET, FILM COATED ORAL DAILY
Status: ACTIVE | COMMUNITY
Start: 2021-09-27

## 2025-05-13 RX ORDER — BACLOFEN 20 MG/1
BACLOFEN( 20MG ORAL  TWICE A DAY ) ACTIVE -HX ENTRY TABLET ORAL TWICE A DAY
Status: ACTIVE | COMMUNITY
Start: 2021-09-27

## 2025-05-13 RX ORDER — SUMATRIPTAN SUCCINATE 50 MG
IMITREX( 50MG ORAL  DAILY ) ACTIVE -HX ENTRY TABLET ORAL DAILY
Status: ACTIVE | COMMUNITY
Start: 2021-09-27

## 2025-05-13 RX ORDER — ESTRADIOL 0.1 MG/G
ESTRACE( 0.1MG/GM VAGINAL   ) ACTIVE -HX ENTRY CREAM VAGINAL
Status: ACTIVE | COMMUNITY
Start: 2021-09-27

## 2025-05-13 RX ORDER — ROSUVASTATIN CALCIUM 5 MG/1
1 TABLET TABLET, COATED ORAL ONCE A DAY
Status: ACTIVE | COMMUNITY

## 2025-05-13 RX ORDER — LOSARTAN POTASSIUM AND HYDROCHLOROTHIAZIDE 100; 12.5 MG/1; MG/1
LOSARTAN POTASSIUM-HCTZ( 100-12.5MG ORAL  DAILY ) ACTIVE -HX ENTRY TABLET, FILM COATED ORAL DAILY
Status: ACTIVE | COMMUNITY
Start: 2021-09-27

## 2025-05-13 RX ORDER — AMLODIPINE BESYLATE 5 MG/1
AMLODIPINE BESYLATE( 5MG ORAL  DAILY ) ACTIVE -HX ENTRY TABLET ORAL DAILY
Status: ACTIVE | COMMUNITY
Start: 2021-09-27

## 2025-05-13 NOTE — HPI-TODAY'S VISIT:
Patient with IBS-C presents for routine visit. Refers continues with Linzess 290mcg/d, psyllium husk bid, and stool softeners prn with adequate control of constipation. She denies nausea/vomiting, dysphagia, odynophagia,heartburn, abdominal pain, diarrhea, melena, rectal bleeding, weight loss, fever.

## 2025-06-11 ENCOUNTER — ERX REFILL RESPONSE (OUTPATIENT)
Dept: URBAN - METROPOLITAN AREA CLINIC 66 | Facility: CLINIC | Age: 65
End: 2025-06-11

## 2025-06-11 RX ORDER — LINACLOTIDE 290 UG/1
TAKE 1 CAPSULE BY MOUTH DAILY 30 MINUTES BEFORE FIRST MEAL OF THE DAY ON AN EMPTY STOMACH CAPSULE, GELATIN COATED ORAL
Qty: 90 CAPSULE | Refills: 3